# Patient Record
Sex: MALE | Race: WHITE | NOT HISPANIC OR LATINO | URBAN - METROPOLITAN AREA
[De-identification: names, ages, dates, MRNs, and addresses within clinical notes are randomized per-mention and may not be internally consistent; named-entity substitution may affect disease eponyms.]

---

## 2023-04-26 DIAGNOSIS — F41.8 DEPRESSION WITH ANXIETY: ICD-10-CM

## 2023-04-26 PROBLEM — S29.019A STRAIN OF THORACIC REGION: Status: ACTIVE | Noted: 2023-04-26

## 2023-04-26 PROBLEM — M62.830 MUSCLE SPASM OF BACK: Status: ACTIVE | Noted: 2023-04-26

## 2023-04-26 RX ORDER — SERTRALINE HYDROCHLORIDE 100 MG/1
100 TABLET, FILM COATED ORAL DAILY
COMMUNITY
Start: 2020-04-13 | End: 2023-04-26 | Stop reason: SDUPTHER

## 2023-04-26 RX ORDER — CYCLOBENZAPRINE HCL 10 MG
TABLET ORAL
COMMUNITY
Start: 2022-10-11 | End: 2024-03-05 | Stop reason: ALTCHOICE

## 2023-04-26 RX ORDER — SERTRALINE HYDROCHLORIDE 100 MG/1
100 TABLET, FILM COATED ORAL DAILY
Qty: 30 TABLET | Refills: 0 | Status: SHIPPED | OUTPATIENT
Start: 2023-04-26 | End: 2023-05-01 | Stop reason: SDUPTHER

## 2023-05-01 DIAGNOSIS — F41.8 DEPRESSION WITH ANXIETY: ICD-10-CM

## 2023-05-01 RX ORDER — SERTRALINE HYDROCHLORIDE 100 MG/1
100 TABLET, FILM COATED ORAL DAILY
Qty: 30 TABLET | Refills: 0 | Status: SHIPPED | OUTPATIENT
Start: 2023-05-01 | End: 2023-06-23 | Stop reason: SDUPTHER

## 2023-06-23 DIAGNOSIS — F41.8 DEPRESSION WITH ANXIETY: ICD-10-CM

## 2023-06-23 RX ORDER — SERTRALINE HYDROCHLORIDE 100 MG/1
150 TABLET, FILM COATED ORAL DAILY
Qty: 45 TABLET | Refills: 0 | Status: SHIPPED | OUTPATIENT
Start: 2023-06-23 | End: 2023-07-13 | Stop reason: SDUPTHER

## 2023-07-13 ENCOUNTER — OFFICE VISIT (OUTPATIENT)
Dept: PRIMARY CARE | Facility: CLINIC | Age: 28
End: 2023-07-13
Payer: COMMERCIAL

## 2023-07-13 VITALS
WEIGHT: 166.4 LBS | SYSTOLIC BLOOD PRESSURE: 122 MMHG | HEIGHT: 73 IN | HEART RATE: 90 BPM | BODY MASS INDEX: 22.05 KG/M2 | DIASTOLIC BLOOD PRESSURE: 78 MMHG

## 2023-07-13 DIAGNOSIS — F41.8 DEPRESSION WITH ANXIETY: ICD-10-CM

## 2023-07-13 DIAGNOSIS — Z00.00 HEALTHCARE MAINTENANCE: Primary | ICD-10-CM

## 2023-07-13 PROCEDURE — 1036F TOBACCO NON-USER: CPT | Performed by: STUDENT IN AN ORGANIZED HEALTH CARE EDUCATION/TRAINING PROGRAM

## 2023-07-13 PROCEDURE — 99395 PREV VISIT EST AGE 18-39: CPT | Performed by: STUDENT IN AN ORGANIZED HEALTH CARE EDUCATION/TRAINING PROGRAM

## 2023-07-13 PROCEDURE — 93000 ELECTROCARDIOGRAM COMPLETE: CPT | Performed by: STUDENT IN AN ORGANIZED HEALTH CARE EDUCATION/TRAINING PROGRAM

## 2023-07-13 RX ORDER — SERTRALINE HYDROCHLORIDE 100 MG/1
150 TABLET, FILM COATED ORAL DAILY
Qty: 135 TABLET | Refills: 1 | Status: SHIPPED | OUTPATIENT
Start: 2023-07-13 | End: 2024-01-25 | Stop reason: SDUPTHER

## 2023-07-13 NOTE — PROGRESS NOTES
"Subjective   Patient ID: Terry Bauer is a 27 y.o. male who presents for Annual Exam.  Today he is accompanied by alone.     HPI  1.  Healthcare maintenance  Overall patient is doing well.   Immunization: Tdap 2020; influenza 2022  COVID-19 vaccine (Pfizer Moderna) up-to-date x2  Colon Cancer Screening: No family history  Diet: Working on diet  Exercise: Working on exercise  Tobacco: Denies use other than vaping occasionally  EtOH: Rarely/socially     2.  Depression with anxiety  Continues to take sertraline/Zoloft 150 mg daily as indicated in chart  Overall feels very well without any major issues/complaints  Denies HI/SI at this point  Requesting refills    Current Outpatient Medications on File Prior to Visit   Medication Sig Dispense Refill    [DISCONTINUED] sertraline (Zoloft) 100 mg tablet Take 1.5 tablets (150 mg) by mouth once daily. 45 tablet 0    cyclobenzaprine (Flexeril) 10 mg tablet Take by mouth.       No current facility-administered medications on file prior to visit.        Allergies   Allergen Reactions    Bupropion Rash       Immunization History   Administered Date(s) Administered    Influenza, seasonal, injectable 10/11/2022    Pfizer Purple Cap SARS-CoV-2 04/05/2021, 05/03/2021    Tdap 01/21/2020         Review of Systems  All pertinent positive symptoms are included in the history of present illness.  All other systems have been reviewed and are negative and noncontributory to this patient's current ailments.     Objective   /78 (BP Location: Left arm, Patient Position: Sitting, BP Cuff Size: Adult)   Pulse 90   Ht 1.854 m (6' 1\")   Wt 75.5 kg (166 lb 6.4 oz)   BMI 21.95 kg/m²   BSA: 1.97 meters squared  No visits with results within 1 Month(s) from this visit.   Latest known visit with results is:   [facility] Encounter on 01/21/2020   Component Date Value Ref Range Status    Cholesterol 01/21/2020 150  0 - 199 mg/dL Final    Comment: .      AGE      DESIRABLE   BORDERLINE HIGH   " HIGH     0-19 Y     0 - 169       170 - 199     >/= 200    20-24 Y     0 - 189       190 - 224     >/= 225         >24 Y     0 - 199       200 - 239     >/= 240   **All ranges are based on fasting samples. Specific   therapeutic targets will vary based on patient-specific   cardiac risk.  .   Pediatric guidelines reference:Pediatrics 2011, 128(S5).   Adult guidelines reference: NCEP ATPIII Guidelines,     ANNA MARIE 2001, 258:0806-97  .   Venipuncture immediately after or during the    administration of Metamizole may lead to falsely   low results. Testing should be performed immediately   prior to Metamizole dosing.      HDL 01/21/2020 53.4  mg/dL Final    Comment: .      AGE      VERY LOW   LOW     NORMAL    HIGH       0-19 Y       < 35   < 40     40-45     ----    20-24 Y       ----   < 40       >45     ----      >24 Y       ----   < 40     40-60      >60  .      Cholesterol/HDL Ratio 01/21/2020 2.8   Final    Comment: REF VALUES  DESIRABLE  < 3.4  HIGH RISK  > 5.0      LDL 01/21/2020 58  0 - 119 mg/dL Final    Comment: .                           NEAR      BORD      AGE      DESIRABLE  OPTIMAL    HIGH     HIGH     VERY HIGH     0-19 Y     0 - 109     ---    110-129   >/= 130     ----    20-24 Y     0 - 119     ---    120-159   >/= 160     ----      >24 Y     0 -  99   100-129  130-159   160-189     >/=190  .      VLDL 01/21/2020 39  0 - 40 mg/dL Final    Triglycerides 01/21/2020 194 (H)  0 - 149 mg/dL Final    Comment: .      AGE      DESIRABLE   BORDERLINE HIGH   HIGH     VERY HIGH   0 D-90 D    19 - 174         ----         ----        ----  91 D- 9 Y     0 -  74        75 -  99     >/= 100      ----    10-19 Y     0 -  89        90 - 129     >/= 130      ----    20-24 Y     0 - 114       115 - 149     >/= 150      ----         >24 Y     0 - 149       150 - 199    200- 499    >/= 500  .   Venipuncture immediately after or during the    administration of Metamizole may lead to falsely   low results. Testing should be  performed immediately   prior to Metamizole dosing.      Non HDL Cholesterol 01/21/2020 97  0 - 149 mg/dL Final    Comment:     AGE      DESIRABLE   BORDERLINE HIGH   HIGH     VERY HIGH     0-19 Y     0 - 119       120 - 144     >/= 145    >/= 160    20-24 Y     0 - 149       150 - 189     >/= 190      ----         >24 Y    30 MG/DL ABOVE LDL CHOLESTEROL GOAL  .      WBC 01/21/2020 7.6  4.4 - 11.3 x10E9/L Final    nRBC 01/21/2020 0.0  0.0 - 0.0 /100 WBC Final    RBC 01/21/2020 4.65  4.50 - 5.90 x10E12/L Final    Hemoglobin 01/21/2020 14.6  13.5 - 17.5 g/dL Final    Hematocrit 01/21/2020 44.8  41.0 - 52.0 % Final    MCV 01/21/2020 96  80 - 100 fL Final    MCHC 01/21/2020 32.6  32.0 - 36.0 g/dL Final    Platelets 01/21/2020 221  150 - 450 x10E9/L Final    RDW 01/21/2020 12.3  11.5 - 14.5 % Final    Neutrophils % 01/21/2020 45.0  40.0 - 80.0 % Final    Immature Granulocytes %, Automated 01/21/2020 0.1  0.0 - 0.9 % Final    Comment:  Percent differential counts (%) should be interpreted in the   context of the absolute cell counts (cells/L).      Lymphocytes % 01/21/2020 39.5  13.0 - 44.0 % Final    Monocytes % 01/21/2020 8.6  2.0 - 10.0 % Final    Eosinophils % 01/21/2020 5.7  0.0 - 6.0 % Final    Basophils % 01/21/2020 1.1  0.0 - 2.0 % Final    Neutrophils Absolute 01/21/2020 3.43  1.20 - 7.70 x10E9/L Final    Lymphocytes Absolute 01/21/2020 3.00  1.20 - 4.80 x10E9/L Final    Monocytes Absolute 01/21/2020 0.65  0.10 - 1.00 x10E9/L Final    Eosinophils Absolute 01/21/2020 0.43  0.00 - 0.70 x10E9/L Final    Basophils Absolute 01/21/2020 0.08  0.00 - 0.10 x10E9/L Final    Glucose 01/21/2020 77  74 - 99 mg/dL Final    Sodium 01/21/2020 139  136 - 145 mmol/L Final    Potassium 01/21/2020 4.6  3.5 - 5.3 mmol/L Final    Chloride 01/21/2020 99  98 - 107 mmol/L Final    Bicarbonate 01/21/2020 29  21 - 32 mmol/L Final    Anion Gap 01/21/2020 16  10 - 20 mmol/L Final    Urea Nitrogen 01/21/2020 25 (H)  6 - 23 mg/dL Final     Creatinine 01/21/2020 1.13  0.50 - 1.30 mg/dL Final    GLOMERULAR FILTRATION RATE-NON AFR* 01/21/2020 >60  >60 mL/min/1.73m2 Final    GLOMERULAR FILTRATION RATE-* 01/21/2020 >60  >60 mL/min/1.73m2 Final    Comment:  CALCULATIONS OF ESTIMATED GFR ARE PERFORMED   USING THE MDRD STUDY EQUATION FOR THE   IDMS-TRACEABLE CREATININE METHODS.   CLIN CHEM 2007;53:766-72      Calcium 01/21/2020 9.8  8.6 - 10.6 mg/dL Final    Albumin 01/21/2020 4.8  3.4 - 5.0 g/dL Final    Alkaline Phosphatase 01/21/2020 76  33 - 120 U/L Final    Total Protein 01/21/2020 7.0  6.4 - 8.2 g/dL Final    AST 01/21/2020 26  9 - 39 U/L Final    Total Bilirubin 01/21/2020 0.6  0.0 - 1.2 mg/dL Final    ALT (SGPT) 01/21/2020 22  10 - 52 U/L Final    Comment:  Patients treated with Sulfasalazine may generate    falsely decreased results for ALT.      TSH 01/21/2020 1.93  0.44 - 3.98 mIU/L Final    Comment:  TSH testing is performed using different testing    methodology at [facility] than at other    [facility] hospitals. Direct result comparisons should    only be made within the same method.  .   Patients receiving more than 5 mg/day of biotin may have interference   in test results.  A sample should be taken no sooner than eight hours   after  previous dose. Contact [phone] for additional information.         Physical Exam  CONSTITUTIONAL - well nourished, well developed, looks like stated age, in no acute distress, not ill-appearing, and not tired appearing  SKIN - normal skin color and pigmentation, normal skin turgor without rash, lesions, or nodules visualized  HEAD - no trauma, normocephalic  EYES - normal external exam  ENT - TM's intact, no injection, no signs of infection, uvula midline, normal tongue movement and throat normal, no exudate, nasal passage without discharge and patent  NECK - supple without rigidity, no neck mass was observed, no thyromegaly or thyroid nodules  CHEST - clear to auscultation, no wheezing, no  crackles and no rales, good effort  CARDIAC - regular rate and regular rhythm, no skipped beats, no murmur  ABDOMEN - no organomegaly, soft, nontender, nondistended, normal bowel sounds, no guarding/rebound/rigidity, negative McBurney sign and negative Eugene sign  EXTREMITIES - no edema, no deformities  NEUROLOGICAL - normal gait, normal balance, normal motor, no ataxia,  PSYCHIATRIC - alert, pleasant and cordial, age-appropriate  IMMUNOLOGIC - no cervical lymphadenopathy     Assessment/Plan   1.  Healthcare maintenance  Complete history and physical examination was performed  EKG reveals normal sinus rhythm without acute changes  We will notify of test results once available and make treatment recommendations accordingly  Please attempt to eat a well-balanced diet and exercise regularly  Tdap 2020  Please attempt to decrease your vaping    2.  Depression with anxiety  Stable.  No changes recommended this time  We will continue sertraline/Zoloft at 150 mg daily  Refill sent to your pharmacy    Please follow-up in 6 months for continued care

## 2024-01-25 DIAGNOSIS — F41.8 DEPRESSION WITH ANXIETY: ICD-10-CM

## 2024-01-25 RX ORDER — SERTRALINE HYDROCHLORIDE 100 MG/1
150 TABLET, FILM COATED ORAL DAILY
Qty: 45 TABLET | Refills: 0 | Status: SHIPPED | OUTPATIENT
Start: 2024-01-25 | End: 2024-03-05 | Stop reason: SDUPTHER

## 2024-03-05 ENCOUNTER — LAB (OUTPATIENT)
Dept: LAB | Facility: LAB | Age: 29
End: 2024-03-05

## 2024-03-05 ENCOUNTER — OFFICE VISIT (OUTPATIENT)
Dept: PRIMARY CARE | Facility: CLINIC | Age: 29
End: 2024-03-05

## 2024-03-05 VITALS
HEART RATE: 102 BPM | SYSTOLIC BLOOD PRESSURE: 118 MMHG | DIASTOLIC BLOOD PRESSURE: 74 MMHG | WEIGHT: 166 LBS | BODY MASS INDEX: 21.9 KG/M2

## 2024-03-05 DIAGNOSIS — Z00.00 HEALTHCARE MAINTENANCE: ICD-10-CM

## 2024-03-05 DIAGNOSIS — F41.8 DEPRESSION WITH ANXIETY: ICD-10-CM

## 2024-03-05 LAB
ALBUMIN SERPL BCP-MCNC: 4.6 G/DL (ref 3.4–5)
ALP SERPL-CCNC: 68 U/L (ref 33–120)
ALT SERPL W P-5'-P-CCNC: 17 U/L (ref 10–52)
ANION GAP SERPL CALC-SCNC: 12 MMOL/L (ref 10–20)
AST SERPL W P-5'-P-CCNC: 20 U/L (ref 9–39)
BASOPHILS # BLD AUTO: 0.07 X10*3/UL (ref 0–0.1)
BASOPHILS NFR BLD AUTO: 1 %
BILIRUB SERPL-MCNC: 0.5 MG/DL (ref 0–1.2)
BUN SERPL-MCNC: 14 MG/DL (ref 6–23)
CALCIUM SERPL-MCNC: 9.2 MG/DL (ref 8.6–10.3)
CHLORIDE SERPL-SCNC: 105 MMOL/L (ref 98–107)
CHOLEST SERPL-MCNC: 157 MG/DL (ref 0–199)
CHOLESTEROL/HDL RATIO: 2.8
CO2 SERPL-SCNC: 26 MMOL/L (ref 21–32)
CREAT SERPL-MCNC: 0.99 MG/DL (ref 0.5–1.3)
EGFRCR SERPLBLD CKD-EPI 2021: >90 ML/MIN/1.73M*2
EOSINOPHIL # BLD AUTO: 0.38 X10*3/UL (ref 0–0.7)
EOSINOPHIL NFR BLD AUTO: 5.5 %
ERYTHROCYTE [DISTWIDTH] IN BLOOD BY AUTOMATED COUNT: 12.1 % (ref 11.5–14.5)
GLUCOSE SERPL-MCNC: 85 MG/DL (ref 74–99)
HCT VFR BLD AUTO: 44.1 % (ref 41–52)
HDLC SERPL-MCNC: 56.2 MG/DL
HGB BLD-MCNC: 14.4 G/DL (ref 13.5–17.5)
IMM GRANULOCYTES # BLD AUTO: 0.02 X10*3/UL (ref 0–0.7)
IMM GRANULOCYTES NFR BLD AUTO: 0.3 % (ref 0–0.9)
LDLC SERPL CALC-MCNC: 90 MG/DL
LYMPHOCYTES # BLD AUTO: 1.68 X10*3/UL (ref 1.2–4.8)
LYMPHOCYTES NFR BLD AUTO: 24.4 %
MCH RBC QN AUTO: 30.5 PG (ref 26–34)
MCHC RBC AUTO-ENTMCNC: 32.7 G/DL (ref 32–36)
MCV RBC AUTO: 93 FL (ref 80–100)
MONOCYTES # BLD AUTO: 0.35 X10*3/UL (ref 0.1–1)
MONOCYTES NFR BLD AUTO: 5.1 %
NEUTROPHILS # BLD AUTO: 4.38 X10*3/UL (ref 1.2–7.7)
NEUTROPHILS NFR BLD AUTO: 63.7 %
NON HDL CHOLESTEROL: 101 MG/DL (ref 0–149)
NRBC BLD-RTO: 0 /100 WBCS (ref 0–0)
PLATELET # BLD AUTO: 214 X10*3/UL (ref 150–450)
POTASSIUM SERPL-SCNC: 4.4 MMOL/L (ref 3.5–5.3)
PROT SERPL-MCNC: 7.1 G/DL (ref 6.4–8.2)
RBC # BLD AUTO: 4.72 X10*6/UL (ref 4.5–5.9)
SODIUM SERPL-SCNC: 139 MMOL/L (ref 136–145)
T4 FREE SERPL-MCNC: 0.68 NG/DL (ref 0.61–1.12)
TRIGL SERPL-MCNC: 54 MG/DL (ref 0–149)
TSH SERPL-ACNC: 0.31 MIU/L (ref 0.44–3.98)
VLDL: 11 MG/DL (ref 0–40)
WBC # BLD AUTO: 6.9 X10*3/UL (ref 4.4–11.3)

## 2024-03-05 PROCEDURE — 36415 COLL VENOUS BLD VENIPUNCTURE: CPT

## 2024-03-05 PROCEDURE — 84439 ASSAY OF FREE THYROXINE: CPT

## 2024-03-05 PROCEDURE — 84443 ASSAY THYROID STIM HORMONE: CPT

## 2024-03-05 PROCEDURE — 80053 COMPREHEN METABOLIC PANEL: CPT

## 2024-03-05 PROCEDURE — 80061 LIPID PANEL: CPT

## 2024-03-05 PROCEDURE — 83036 HEMOGLOBIN GLYCOSYLATED A1C: CPT

## 2024-03-05 PROCEDURE — 85025 COMPLETE CBC W/AUTO DIFF WBC: CPT

## 2024-03-05 PROCEDURE — 1036F TOBACCO NON-USER: CPT | Performed by: STUDENT IN AN ORGANIZED HEALTH CARE EDUCATION/TRAINING PROGRAM

## 2024-03-05 PROCEDURE — 99213 OFFICE O/P EST LOW 20 MIN: CPT | Performed by: STUDENT IN AN ORGANIZED HEALTH CARE EDUCATION/TRAINING PROGRAM

## 2024-03-05 RX ORDER — SERTRALINE HYDROCHLORIDE 100 MG/1
150 TABLET, FILM COATED ORAL DAILY
Qty: 135 TABLET | Refills: 1 | Status: SHIPPED | OUTPATIENT
Start: 2024-03-05

## 2024-03-05 ASSESSMENT — PATIENT HEALTH QUESTIONNAIRE - PHQ9
SUM OF ALL RESPONSES TO PHQ9 QUESTIONS 1 AND 2: 0
2. FEELING DOWN, DEPRESSED OR HOPELESS: NOT AT ALL
1. LITTLE INTEREST OR PLEASURE IN DOING THINGS: NOT AT ALL

## 2024-03-05 NOTE — PROGRESS NOTES
Subjective   Patient ID: Terry Bauer is a 28 y.o. male who presents for Depression and Anxiety.  Today he is accompanied by alone.     HPI  1.   Depression with anxiety  Continues to take sertraline/Zoloft 150 mg daily as indicated in chart.   Overall feels very well without any major issues/complaints.  Denies HI/SI.  Requesting refills.      2. Healthcare maintenance  Overall patient is doing well.   Immunization: Tdap 2020; influenza 2022  COVID-19 vaccine (Pfizer Moderna) up-to-date x2  Colon Cancer Screening: No family history  Diet: Working on diet  Exercise: Working on exercise  Tobacco: Denies use other than vaping occasionally  EtOH: Socially   Family h/o diabetes diagnosed in early 30's. His dad has diabetes and his brother who is 30 years old recently got diagnosed after losing a significant amount of weight and passing out, and is asking if we need to check his lab work.    Current Outpatient Medications on File Prior to Visit   Medication Sig Dispense Refill    [DISCONTINUED] cyclobenzaprine (Flexeril) 10 mg tablet Take by mouth.      [DISCONTINUED] sertraline (Zoloft) 100 mg tablet Take 1.5 tablets (150 mg) by mouth once daily. 45 tablet 0     No current facility-administered medications on file prior to visit.        Allergies   Allergen Reactions    Bupropion Rash       Immunization History   Administered Date(s) Administered    Influenza, seasonal, injectable 10/11/2022    Pfizer Purple Cap SARS-CoV-2 04/05/2021, 05/03/2021    Tdap vaccine, age 7 year and older (BOOSTRIX, ADACEL) 01/21/2020         Review of Systems  All pertinent positive symptoms are included in the history of present illness.  All other systems have been reviewed and are negative and noncontributory to this patient's current ailments.     Objective   /74 (BP Location: Left arm, Patient Position: Sitting, BP Cuff Size: Adult)   Pulse 102   Wt 75.3 kg (166 lb)   BMI 21.90 kg/m²   BSA: 1.97 meters squared  No visits with  results within 1 Month(s) from this visit.   Latest known visit with results is:   Legacy Encounter on 01/21/2020   Component Date Value Ref Range Status    Cholesterol 01/21/2020 150  0 - 199 mg/dL Final    Comment: .      AGE      DESIRABLE   BORDERLINE HIGH   HIGH     0-19 Y     0 - 169       170 - 199     >/= 200    20-24 Y     0 - 189       190 - 224     >/= 225         >24 Y     0 - 199       200 - 239     >/= 240   **All ranges are based on fasting samples. Specific   therapeutic targets will vary based on patient-specific   cardiac risk.  .   Pediatric guidelines reference:Pediatrics 2011, 128(S5).   Adult guidelines reference: NCEP ATPIII Guidelines,     ANNA MARIE 2001, 258:2486-97  .   Venipuncture immediately after or during the    administration of Metamizole may lead to falsely   low results. Testing should be performed immediately   prior to Metamizole dosing.      HDL 01/21/2020 53.4  mg/dL Final    Comment: .      AGE      VERY LOW   LOW     NORMAL    HIGH       0-19 Y       < 35   < 40     40-45     ----    20-24 Y       ----   < 40       >45     ----      >24 Y       ----   < 40     40-60      >60  .      Cholesterol/HDL Ratio 01/21/2020 2.8   Final    Comment: REF VALUES  DESIRABLE  < 3.4  HIGH RISK  > 5.0      LDL 01/21/2020 58  0 - 119 mg/dL Final    Comment: .                           NEAR      BORD      AGE      DESIRABLE  OPTIMAL    HIGH     HIGH     VERY HIGH     0-19 Y     0 - 109     ---    110-129   >/= 130     ----    20-24 Y     0 - 119     ---    120-159   >/= 160     ----      >24 Y     0 -  99   100-129  130-159   160-189     >/=190  .      VLDL 01/21/2020 39  0 - 40 mg/dL Final    Triglycerides 01/21/2020 194 (H)  0 - 149 mg/dL Final    Comment: .      AGE      DESIRABLE   BORDERLINE HIGH   HIGH     VERY HIGH   0 D-90 D    19 - 174         ----         ----        ----  91 D- 9 Y     0 -  74        75 -  99     >/= 100      ----    10-19 Y     0 -  89        90 - 129     >/= 130       ----    20-24 Y     0 - 114       115 - 149     >/= 150      ----         >24 Y     0 - 149       150 - 199    200- 499    >/= 500  .   Venipuncture immediately after or during the    administration of Metamizole may lead to falsely   low results. Testing should be performed immediately   prior to Metamizole dosing.      Non HDL Cholesterol 01/21/2020 97  0 - 149 mg/dL Final    Comment:     AGE      DESIRABLE   BORDERLINE HIGH   HIGH     VERY HIGH     0-19 Y     0 - 119       120 - 144     >/= 145    >/= 160    20-24 Y     0 - 149       150 - 189     >/= 190      ----         >24 Y    30 MG/DL ABOVE LDL CHOLESTEROL GOAL  .      WBC 01/21/2020 7.6  4.4 - 11.3 x10E9/L Final    nRBC 01/21/2020 0.0  0.0 - 0.0 /100 WBC Final    RBC 01/21/2020 4.65  4.50 - 5.90 x10E12/L Final    Hemoglobin 01/21/2020 14.6  13.5 - 17.5 g/dL Final    Hematocrit 01/21/2020 44.8  41.0 - 52.0 % Final    MCV 01/21/2020 96  80 - 100 fL Final    MCHC 01/21/2020 32.6  32.0 - 36.0 g/dL Final    Platelets 01/21/2020 221  150 - 450 x10E9/L Final    RDW 01/21/2020 12.3  11.5 - 14.5 % Final    Neutrophils % 01/21/2020 45.0  40.0 - 80.0 % Final    Immature Granulocytes %, Automated 01/21/2020 0.1  0.0 - 0.9 % Final    Comment:  Percent differential counts (%) should be interpreted in the   context of the absolute cell counts (cells/L).      Lymphocytes % 01/21/2020 39.5  13.0 - 44.0 % Final    Monocytes % 01/21/2020 8.6  2.0 - 10.0 % Final    Eosinophils % 01/21/2020 5.7  0.0 - 6.0 % Final    Basophils % 01/21/2020 1.1  0.0 - 2.0 % Final    Neutrophils Absolute 01/21/2020 3.43  1.20 - 7.70 x10E9/L Final    Lymphocytes Absolute 01/21/2020 3.00  1.20 - 4.80 x10E9/L Final    Monocytes Absolute 01/21/2020 0.65  0.10 - 1.00 x10E9/L Final    Eosinophils Absolute 01/21/2020 0.43  0.00 - 0.70 x10E9/L Final    Basophils Absolute 01/21/2020 0.08  0.00 - 0.10 x10E9/L Final    Glucose 01/21/2020 77  74 - 99 mg/dL Final    Sodium 01/21/2020 139  136 - 145 mmol/L  Final    Potassium 01/21/2020 4.6  3.5 - 5.3 mmol/L Final    Chloride 01/21/2020 99  98 - 107 mmol/L Final    Bicarbonate 01/21/2020 29  21 - 32 mmol/L Final    Anion Gap 01/21/2020 16  10 - 20 mmol/L Final    Urea Nitrogen 01/21/2020 25 (H)  6 - 23 mg/dL Final    Creatinine 01/21/2020 1.13  0.50 - 1.30 mg/dL Final    GLOMERULAR FILTRATION RATE-NON AFR* 01/21/2020 >60  >60 mL/min/1.73m2 Final    GLOMERULAR FILTRATION RATE-* 01/21/2020 >60  >60 mL/min/1.73m2 Final    Comment:  CALCULATIONS OF ESTIMATED GFR ARE PERFORMED   USING THE MDRD STUDY EQUATION FOR THE   IDMS-TRACEABLE CREATININE METHODS.   CLIN CHEM 2007;53:766-72      Calcium 01/21/2020 9.8  8.6 - 10.6 mg/dL Final    Albumin 01/21/2020 4.8  3.4 - 5.0 g/dL Final    Alkaline Phosphatase 01/21/2020 76  33 - 120 U/L Final    Total Protein 01/21/2020 7.0  6.4 - 8.2 g/dL Final    AST 01/21/2020 26  9 - 39 U/L Final    Total Bilirubin 01/21/2020 0.6  0.0 - 1.2 mg/dL Final    ALT (SGPT) 01/21/2020 22  10 - 52 U/L Final    Comment:  Patients treated with Sulfasalazine may generate    falsely decreased results for ALT.      TSH 01/21/2020 1.93  0.44 - 3.98 mIU/L Final    Comment:  TSH testing is performed using different testing    methodology at [facility] than at other    [facility] hospitals. Direct result comparisons should    only be made within the same method.  .   Patients receiving more than 5 mg/day of biotin may have interference   in test results.  A sample should be taken no sooner than eight hours   after  previous dose. Contact [phone] for additional information.         Physical Exam  CONSTITUTIONAL - well nourished, well developed, looks like stated age, in no acute distress, not ill-appearing, and not tired appearing  SKIN - normal skin color and pigmentation, normal skin turgor without rash, lesions, or nodules visualized  HEAD - no trauma, normocephalic  EYES - extraocular muscles are intact, and normal external exam  ENT -  TM's intact, no injection, no signs of infection, uvula midline, normal tongue movement and throat normal  NECK - supple without rigidity, no neck mass was observed  CHEST - clear to auscultation, no wheezing, no crackles and no rales, good effort  CARDIAC - regular rate and regular rhythm, no skipped beats, no murmur  EXTREMITIES - no edema, no deformities  NEUROLOGICAL - normal gait, normal balance, normal motor, no ataxia; alert, oriented and no focal signs  PSYCHIATRIC - alert, pleasant and cordial, age-appropriate  IMMUNOLOGIC - no cervical lymphadenopathy     Assessment/Plan   1.  Depression with anxiety  Stable.  No changes recommended this time  We will continue sertraline/Zoloft at 150 mg daily  Refill sent to your pharmacy     2.  Healthcare maintenance  Patient is fasting for blood work   He will complete blood work including HbA1c given brother's recent diagnosis of DM   Please attempt to eat a well-balanced diet and exercise regularly  Tdap 2020, rest of immunizations UTD     Please follow-up in 6 months for continued care and for annual health maintenance visit.

## 2024-03-06 LAB
EST. AVERAGE GLUCOSE BLD GHB EST-MCNC: 114 MG/DL
HBA1C MFR BLD: 5.6 %

## 2024-03-06 NOTE — RESULT ENCOUNTER NOTE
Surprisingly thyroid-stimulating hormone is on the low side at 0.31 but the free T4 is within normal limits    Will continue monitoring closely    Cholesterol looks great at 157, HDL 56, LDL 90, triglycerides 54    Sugar, kidneys, liver, electrodes are all within normal limits    Complete blood cell count shows no anemia    We are just waiting for the hemoglobin A1c at this time

## 2024-09-17 DIAGNOSIS — F41.8 DEPRESSION WITH ANXIETY: ICD-10-CM

## 2024-09-17 RX ORDER — SERTRALINE HYDROCHLORIDE 100 MG/1
150 TABLET, FILM COATED ORAL DAILY
Qty: 45 TABLET | Refills: 0 | Status: SHIPPED | OUTPATIENT
Start: 2024-09-17

## 2024-09-23 ENCOUNTER — APPOINTMENT (OUTPATIENT)
Dept: PRIMARY CARE | Facility: CLINIC | Age: 29
End: 2024-09-23
Payer: COMMERCIAL

## 2024-09-23 ENCOUNTER — LAB (OUTPATIENT)
Dept: LAB | Facility: LAB | Age: 29
End: 2024-09-23
Payer: COMMERCIAL

## 2024-09-23 VITALS
OXYGEN SATURATION: 96 % | BODY MASS INDEX: 22.4 KG/M2 | WEIGHT: 169 LBS | HEIGHT: 73 IN | SYSTOLIC BLOOD PRESSURE: 120 MMHG | HEART RATE: 105 BPM | DIASTOLIC BLOOD PRESSURE: 70 MMHG

## 2024-09-23 DIAGNOSIS — Z00.00 HEALTHCARE MAINTENANCE: Primary | ICD-10-CM

## 2024-09-23 DIAGNOSIS — R79.89 ABNORMAL TSH: ICD-10-CM

## 2024-09-23 DIAGNOSIS — F41.8 DEPRESSION WITH ANXIETY: ICD-10-CM

## 2024-09-23 DIAGNOSIS — M54.50 LOW BACK PAIN, UNSPECIFIED BACK PAIN LATERALITY, UNSPECIFIED CHRONICITY, UNSPECIFIED WHETHER SCIATICA PRESENT: ICD-10-CM

## 2024-09-23 LAB
T4 FREE SERPL-MCNC: 0.8 NG/DL (ref 0.61–1.12)
TSH SERPL-ACNC: 0.3 MIU/L (ref 0.44–3.98)

## 2024-09-23 PROCEDURE — 3008F BODY MASS INDEX DOCD: CPT | Performed by: STUDENT IN AN ORGANIZED HEALTH CARE EDUCATION/TRAINING PROGRAM

## 2024-09-23 PROCEDURE — 99213 OFFICE O/P EST LOW 20 MIN: CPT | Performed by: STUDENT IN AN ORGANIZED HEALTH CARE EDUCATION/TRAINING PROGRAM

## 2024-09-23 PROCEDURE — 36415 COLL VENOUS BLD VENIPUNCTURE: CPT

## 2024-09-23 PROCEDURE — 99395 PREV VISIT EST AGE 18-39: CPT | Performed by: STUDENT IN AN ORGANIZED HEALTH CARE EDUCATION/TRAINING PROGRAM

## 2024-09-23 PROCEDURE — 84439 ASSAY OF FREE THYROXINE: CPT

## 2024-09-23 PROCEDURE — 1036F TOBACCO NON-USER: CPT | Performed by: STUDENT IN AN ORGANIZED HEALTH CARE EDUCATION/TRAINING PROGRAM

## 2024-09-23 PROCEDURE — 84443 ASSAY THYROID STIM HORMONE: CPT

## 2024-09-23 RX ORDER — SERTRALINE HYDROCHLORIDE 100 MG/1
150 TABLET, FILM COATED ORAL DAILY
Qty: 135 TABLET | Refills: 1 | Status: SHIPPED | OUTPATIENT
Start: 2024-09-23

## 2024-09-23 ASSESSMENT — PATIENT HEALTH QUESTIONNAIRE - PHQ9
SUM OF ALL RESPONSES TO PHQ9 QUESTIONS 1 AND 2: 0
1. LITTLE INTEREST OR PLEASURE IN DOING THINGS: NOT AT ALL
2. FEELING DOWN, DEPRESSED OR HOPELESS: NOT AT ALL

## 2024-09-23 NOTE — PROGRESS NOTES
Subjective   Patient ID: Terry Bauer is a 28 y.o. male who presents for Annual Exam.  Today he is accompanied by alone.     HPI  Healthcare maintenance  Overall patient is doing well.   Denies any chest pain, shortness of breath or wheezing upon exertion.  Denies any fever chills or constitutional symptoms, denies any unintentional weight loss.  Denies any nausea/vomiting or constipation/diarrhea.  Denies any urinary symptoms.  Denies any recent change in hearing or vision.  Denies any lightheadedness, dizziness or balance problem   Immunization: Tdap 2020; influenza declined today.  COVID-19 vaccine (Pfizer Moderna) up-to-date x2  Colon Cancer Screening: No family history, will resume screening at age 47  Diet: Working on diet  Exercise: Exercising 45 to 60 minutes/day, 3 to 5 days/week  Tobacco: Denies use other than vaping occasionally  EtOH: Socially    Depression with anxiety  Continues to take sertraline/Zoloft 150 mg daily as indicated in chart.   Overall feels very well without any major issues/complaints.  Denies HI/SI.  Requesting refill    Abnormal TSH  During last visit in March 2024 he was noticed to have decreased TSH with normal free T4.  Patient feels well,  without any signs or symptoms, willing to recheck his thyroid function.    Lower back pain  Patient complains of recurrent intermittent lower back pain they are localized mostly on the left side.  The pain is sharp in nature, rated 8-9 / 10 at its highest intensity, lasts for few seconds and sometimes travel to his left lower extremity.  Denies any sensorial deficits.  He has a history of scoliosis, x-rays done in 2018 did not show any abnormalities.  Patient admits that he was doing stretching exercises in the past at during that time he was feeling, without any lower back pain.  Stated recently he has stopped doing those exercises and has felt lower back pain more often.  He he was wondering if this was a muscle problem       Current  "Outpatient Medications on File Prior to Visit   Medication Sig Dispense Refill    [DISCONTINUED] sertraline (Zoloft) 100 mg tablet Take 1.5 tablets (150 mg) by mouth once daily. 135 tablet 1    [DISCONTINUED] sertraline (Zoloft) 100 mg tablet Take 1.5 tablets (150 mg) by mouth once daily. 45 tablet 0     No current facility-administered medications on file prior to visit.        Allergies   Allergen Reactions    Bupropion Rash       Immunization History   Administered Date(s) Administered    DTaP / Hib 08/19/1999    DTaP, Unspecified 05/30/2003    Flu vaccine (IIV4), preservative free *Check age/dose* 10/11/2022    Influenza, seasonal, injectable 10/11/2022    MMR vaccine, subcutaneous (MMR II) 02/18/1999, 05/30/2003    Novel influenza-H1N1-09, preservative-free 12/01/2009    Pfizer Purple Cap SARS-CoV-2 04/05/2021, 05/03/2021    Poliovirus vaccine, subcutaneous (IPOL) 08/19/1999, 05/30/2003    Tdap vaccine, age 7 year and older (BOOSTRIX, ADACEL) 01/21/2020    Varicella vaccine, subcutaneous (VARIVAX) 02/18/1999, 11/04/2008         Review of Systems  All pertinent positive symptoms are included in the history of present illness.  All other systems have been reviewed and are negative and noncontributory to this patient's current ailments.     Objective   /70 (BP Location: Left arm, Patient Position: Sitting, BP Cuff Size: Adult)   Pulse 105   Ht 1.854 m (6' 1\")   Wt 76.7 kg (169 lb)   SpO2 96%   BMI 22.30 kg/m²   BSA: 1.99 meters squared      Physical Exam  CONSTITUTIONAL - well nourished, well developed, looks like stated age, in no acute distress, not ill-appearing, and not tired appearing  SKIN - normal skin color and pigmentation, normal skin turgor without rash, lesions, or nodules visualized  HEAD - no trauma, normocephalic  EYES - extraocular muscles are intact, and normal external exam  ENT -  uvula midline, normal tongue movement and throat normal  NECK - supple without rigidity, no neck mass was " observed  CHEST - clear to auscultation, no wheezing, no crackles and no rales, good effort  CARDIAC - regular rate and regular rhythm, no skipped beats, no murmur  EXTREMITIES - no edema, no deformities  NEUROLOGICAL - normal gait, normal balance, normal motor, no ataxia; alert, oriented and no focal signs  PSYCHIATRIC - alert, pleasant and cordial, age-appropriate  IMMUNOLOGIC - no cervical lymphadenopathy     Assessment/Plan   Healthcare maintenance  Complete history and physical examination was performed, with normal findings  Patient declines flu shot today, recommend to reconsider in the future.  Educated about the importance of conducting a healthy lifestyle, following well-balanced diet and exercising regularly     Depression with anxiety  Stable.  No changes recommended this time  We will continue sertraline/Zoloft at 150 mg daily  Refill sent to your pharmacy    Abnormal TSH  Requisition for TSH with reflex free T4 was provided.  Will review results and make treatment recommendation accordingly.    Lower back pain  Had a long discussion about possibilities of lower back pain.  Discussed further investigation with imaging and/or referral to physical therapy, options that were declined for the moment by patient.  Clinical presentation and physical exam are more consistent with a pinched nerve, in the setting of scoliosis, no becoming more prominent since stopping his stretching exercise.  Recommend to restart in home/gym stretching exercise and monitor for worsening symptoms.  If at any moment symptoms become worrisome, patient was instructed to return to the clinic for further evaluation.         Thank you for letting us be a part of your care team.  Please call the office if you have further questions or concerns regarding your care    Otherwise, please follow-up in 6 months for continued care and refills as well as your yearly physical examination    Liliane Cortez MD  PGY2, FM Resident

## 2024-09-24 DIAGNOSIS — R79.89 ABNORMAL TSH: Primary | ICD-10-CM

## 2024-09-24 NOTE — RESULT ENCOUNTER NOTE
Thyroid-stimulating hormone continues to be low at 0.3 and free T4 within normal limits    If the patient feels well I am not fully concerned but to be fully thorough, I will order a ultrasound of his thyroid to make sure there is nothing underlying    Also, I would recommend he repeats his thyroid within the next 3 months  Lab work will be placed in the chart

## 2024-10-08 ENCOUNTER — HOSPITAL ENCOUNTER (OUTPATIENT)
Dept: RADIOLOGY | Facility: CLINIC | Age: 29
Discharge: HOME | End: 2024-10-08
Payer: COMMERCIAL

## 2024-10-08 DIAGNOSIS — R79.89 ABNORMAL TSH: ICD-10-CM

## 2024-10-08 PROCEDURE — 76536 US EXAM OF HEAD AND NECK: CPT

## 2024-10-08 PROCEDURE — 76536 US EXAM OF HEAD AND NECK: CPT | Performed by: RADIOLOGY

## 2024-10-10 NOTE — RESULT ENCOUNTER NOTE
Ultrasound of the thyroid is unremarkable    I would recommend that he gets the repeat TSH lab work done at his earliest mutual convenience and if this continues to be low we will have to discuss an endocrinology referral

## 2025-01-13 DIAGNOSIS — F41.8 DEPRESSION WITH ANXIETY: ICD-10-CM

## 2025-01-13 RX ORDER — SERTRALINE HYDROCHLORIDE 100 MG/1
150 TABLET, FILM COATED ORAL DAILY
Qty: 45 TABLET | Refills: 0 | Status: SHIPPED | OUTPATIENT
Start: 2025-01-13

## 2025-03-17 ENCOUNTER — APPOINTMENT (OUTPATIENT)
Dept: PRIMARY CARE | Facility: CLINIC | Age: 30
End: 2025-03-17
Payer: COMMERCIAL

## 2025-03-17 VITALS
WEIGHT: 171 LBS | HEART RATE: 108 BPM | OXYGEN SATURATION: 97 % | DIASTOLIC BLOOD PRESSURE: 66 MMHG | BODY MASS INDEX: 22.66 KG/M2 | SYSTOLIC BLOOD PRESSURE: 120 MMHG | HEIGHT: 73 IN

## 2025-03-17 DIAGNOSIS — F41.8 DEPRESSION WITH ANXIETY: ICD-10-CM

## 2025-03-17 PROCEDURE — 3008F BODY MASS INDEX DOCD: CPT | Performed by: STUDENT IN AN ORGANIZED HEALTH CARE EDUCATION/TRAINING PROGRAM

## 2025-03-17 PROCEDURE — 99213 OFFICE O/P EST LOW 20 MIN: CPT | Performed by: STUDENT IN AN ORGANIZED HEALTH CARE EDUCATION/TRAINING PROGRAM

## 2025-03-17 PROCEDURE — 1036F TOBACCO NON-USER: CPT | Performed by: STUDENT IN AN ORGANIZED HEALTH CARE EDUCATION/TRAINING PROGRAM

## 2025-03-17 RX ORDER — SERTRALINE HYDROCHLORIDE 100 MG/1
150 TABLET, FILM COATED ORAL DAILY
Qty: 135 TABLET | Refills: 1 | Status: SHIPPED | OUTPATIENT
Start: 2025-03-17

## 2025-03-17 ASSESSMENT — PATIENT HEALTH QUESTIONNAIRE - PHQ9
2. FEELING DOWN, DEPRESSED OR HOPELESS: NOT AT ALL
1. LITTLE INTEREST OR PLEASURE IN DOING THINGS: NOT AT ALL
SUM OF ALL RESPONSES TO PHQ9 QUESTIONS 1 AND 2: 0